# Patient Record
Sex: FEMALE | Race: WHITE
[De-identification: names, ages, dates, MRNs, and addresses within clinical notes are randomized per-mention and may not be internally consistent; named-entity substitution may affect disease eponyms.]

---

## 2017-05-24 ENCOUNTER — HOSPITAL ENCOUNTER (OUTPATIENT)
Dept: HOSPITAL 80 - FIMAGING | Age: 47
End: 2017-05-24
Attending: FAMILY MEDICINE
Payer: COMMERCIAL

## 2017-05-24 DIAGNOSIS — Z12.31: Primary | ICD-10-CM

## 2017-05-24 PROCEDURE — G0202 SCR MAMMO BI INCL CAD: HCPCS

## 2018-06-06 ENCOUNTER — HOSPITAL ENCOUNTER (OUTPATIENT)
Dept: HOSPITAL 80 - FIMAGING | Age: 48
End: 2018-06-06
Attending: FAMILY MEDICINE
Payer: COMMERCIAL

## 2018-06-06 DIAGNOSIS — Z12.31: Primary | ICD-10-CM

## 2019-04-18 ENCOUNTER — HOSPITAL ENCOUNTER (INPATIENT)
Dept: HOSPITAL 80 - FED | Age: 49
LOS: 1 days | Discharge: HOME | DRG: 312 | End: 2019-04-19
Attending: SURGERY | Admitting: SURGERY
Payer: COMMERCIAL

## 2019-04-18 DIAGNOSIS — S01.511A: ICD-10-CM

## 2019-04-18 DIAGNOSIS — I61.0: ICD-10-CM
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
## 2019-04-18 DIAGNOSIS — R55: Primary | ICD-10-CM

## 2019-04-18 DIAGNOSIS — S02.5XXA: ICD-10-CM
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
## 2019-04-18 LAB
INR PPP: 1.06 (ref 0.83–1.16)
PLATELET # BLD: 201 10^3/UL (ref 150–400)
PROTHROMBIN TIME: 13.4 SEC (ref 12–15)

## 2019-04-18 PROCEDURE — 0CQ1XZZ REPAIR LOWER LIP, EXTERNAL APPROACH: ICD-10-PCS

## 2019-04-18 PROCEDURE — A9500 TC99M SESTAMIBI: HCPCS

## 2019-04-18 RX ADMIN — IBUPROFEN SCH: 600 TABLET ORAL at 23:31

## 2019-04-18 RX ADMIN — ACETAMINOPHEN PRN MG: 325 TABLET ORAL at 18:25

## 2019-04-18 RX ADMIN — BACITRACIN ZINC SCH: 500 OINTMENT TOPICAL at 23:31
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
## 2019-04-19 VITALS — DIASTOLIC BLOOD PRESSURE: 77 MMHG | SYSTOLIC BLOOD PRESSURE: 111 MMHG

## 2019-04-19 RX ADMIN — BACITRACIN ZINC SCH APP: 500 OINTMENT TOPICAL at 01:24

## 2019-04-19 RX ADMIN — IBUPROFEN SCH MG: 600 TABLET ORAL at 08:59

## 2019-04-19 RX ADMIN — BACITRACIN ZINC SCH APP: 500 OINTMENT TOPICAL at 08:58

## 2019-04-19 RX ADMIN — ACETAMINOPHEN PRN MG: 325 TABLET ORAL at 15:20

## 2019-04-19 RX ADMIN — IBUPROFEN SCH: 600 TABLET ORAL at 06:40

## 2019-04-19 RX ADMIN — IBUPROFEN SCH MG: 600 TABLET ORAL at 01:32
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
## 2019-04-19 NOTE — PDCARST
CAR Stress Test Results


Type of Stress Test: Lexiscan stress test


Indication: syncope


Description of Procedure: After informed consent was obtained, pt was 

established to ECG, blood pressure, HR and oximetry monitoring.  STRESS EKG AND 

HEMODYNAMIC DATA.  Resting heart rate:  63 BPM.  Resting ECG: SR.  Resting 

blood pressure: 104/62  mmHg.  O2 saturation at rest: 93%.  Peak heart rate: 89

  BPM.  Peak blood pressure:    98/56  mmHg.  Arrhythmias:  none.  Symptoms: 

The patient experienced no typical symptoms of angina during stress or 

recovery.  Stress/Infusion ECG: No change in rhythm with no significant ST/T 

wave changes.  Stress/infusion O2 saturation: 97%


Impression: Uneventful Lexiscan infusion.


Conclusion: Await nuclear images.

## 2019-04-19 NOTE — ECHO
[email or web address]





[address] [address] 

Main: [phone] 



Echocardiography Examination 

Transthoracic 



Name:            BJ MEDINA                       MR#:

[id number]

Study Date:      04/19/2019                             Study Time:

08:02 AM

YOB: 1970                             Age:

48 year(s)

Height:          167.6 cm (66 in.)                      Weight:

61.24 kg (135 lb.)

BSA:             1.69 m2                                Gender:

Female

Examination:     Echo                                   Contrast: 

Image Quality:   Adequate                               Rhythm: 

Heart Rate:                                             BP:

108 mmHg/63 mmHg

Indication:      ?syncope 



Procedure Staff 

Referring Physician: 

Echocardiographer:         Shannon Zamora [facility] 

Reading Physician:         Damir Montana MD 

Requesting Provider: 

Indication:                ?syncope 



Measurements 

Chambers                                          AV/MV 

Label                 Value       Normal Value          Label

Value       Normal Value

LVOT Vmax            0.91 m/s     (0.7m/s - 1.1m/s)     AV PGmax

7 mmHg

LVOTd                2 cm         (1.8cm - 2cm)         AV PGmean

5 mmHg

LVOT VTI             21.1 cm      (18cm - 22cm)         AV Vmax

1.31 m/s

LVDd, 2D             4.5 cm       (3.9cm - 5.3cm)       JONH (Vmax)

2.2 cm2

LVDs, 2D             2.7 cm       (2.1cm - 4cm)         JONH (VTI)

2.2 cm2

IVSd, 2D             0.9 cm       (0.6cm - 1.1cm)       MV E Vmax

0.76 m/s

LVPWd, 2D            0.8 cm                             MV A Vmax

0.45 m/s

LVEF, BP             57 %         (55% - 70%)           MV E/A

1.69

LVEF, 2D             70 %         (54% - 74%)           MV E/E'

lateral      6.7

LVOT PGmean          2 mmHg                             MV E/E' septal

8.1 (0.5 - 1.7)

LVOT Vmean           0.71 m/s                           MV DT

243 ms

RVDd, 2D             1.9 cm       (1.9cm - 3.8cm)       MV E' septal

0.09 m/s

LA Volume, BP        22 ml        (22ml - 52ml)         MV PHT

0.07 s

LADs, 2D             2.8 cm       (2.7cm - 3.8cm)       MVA PHT

3 cm2

LAESV index, BP      13 ml/m2                           MV E' lateral

0.11 m/s

RA Area              10.4 cm2                           MV E/E' mean

7.6

Additional Vessels                                      MV PHT

73 ms

Label                 Value       Normal Value          MV E' mean

0.1 m/s

AoAsc                2.8 cm                       TV/PV 

AoRoot, 2D           2.7 cm       (1.4cm - 2.6cm)       Label

Value       Normal Value



Patient: BJ MEDINA                     MRN: [id number]

Study Date: 04/19/2019   Page 1 of 3

08:02 AM 









IVC 2.2 cm (1.2cm - 2.3cm) RA Pressure 5 mmHg 

PV PGmax             4 mmHg 

PV Vmax, Caliper     0.99 m/s     (0.6m/s - 0.9m/s) 



Conclusions 



Left Ventricle: 

 Left ventricle is normal in size.  

 EF range is estimated at 55 % - 60 %.  

 Left ventricular diastolic function parameters are normal.  

 No LV hypertrophy.  



Right Ventricle: 

 Right ventricular systolic function is normal.  



Left Atrium: 

 The left atrium is normal in size.  



Right Atrium: 

 The right atrium is normal in size.  



Pericardium: 

 No pericardial effusion.  



Findings 



Left Ventricle: 

Left ventricle is normal in size. Normal global systolic left

ventricular function. The ejection fraction, measured by

Simpsons method, is 57 %. EF range is estimated at 55 % - 60 %. Left

ventricle wall thickness is normal. There are no

regional wall motion abnormalities. Left ventricular diastolic

function parameters are normal. No LV hypertrophy.

Right Ventricle: 

Normal size right ventricle. Right ventricular systolic function is

normal.

Left Atrium: 

The left atrium is normal in size.  

Right Atrium: 

The right atrium is normal in size.  

Mitral Valve: 

Mitral valve appears structurally normal. Mild mitral regurgitation.

No mitral valve stenosis.

Aortic Valve: 

Aortic leaflets are structurally normal. Trivial aortic regurgitation

is present. There is no aortic stenosis.

Tricuspid Valve: 

Tricuspid valve leaflets are structurally normal. Trivial tricuspid

regurgitation. No tricuspid valve stenosis. Pulmonary

artery pressure cannot be assessed due to inadequate TR signal.  

Pulmonic Valve: 

Pulmonic leaflets are structurally normal. No significant pulmonic

valve regurgitation is evident.

Aorta: 

The aortic root size in 2D measures 2.7 cm. The ascending aorta

measures 2.8 cm.

Aorta Measurements 

AoRoot, 2D is 2.7 cm.  

IVC: 

The inferior vena cava is normal in size.  

Pericardium: 

No pericardial effusion. No pleural effusion present.  



Patient: BJ MEDINA                     MRN: [id number]

Study Date: 04/19/2019   Page 2 of 3

08:02 AM 









Exam Details 

Procedure Ordered:         Echo 

Procedure Status:          Routine study 

Image Quality:             Adequate 

Facility Location:         Bedside 



Electronically signed by Damir Montana MD on 04/19/2019 at 06:36 PM 

(No Signature Object) 



Patient: BJ MEDINA                     MRN: [id number]

Study Date: 04/19/2019   Page 3 of 3

08:02 AM 







[id number]
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
## 2019-04-20 NOTE — CPEKG
Test Reason : OPEN

Blood Pressure : ***/*** mmHG

Vent. Rate : 063 BPM     Atrial Rate : 063 BPM

   P-R Int : 182 ms          QRS Dur : 078 ms

    QT Int : 425 ms       P-R-T Axes : 028 059 048 degrees

   QTc Int : 436 ms

 

Sinus rhythm

 

Confirmed by Chel Jose ([id number]) on 4/20/2019 3:14:22 PM

 

Referred By: Fred Ulloa           Confirmed By:Chel Jose

## 2019-06-07 ENCOUNTER — HOSPITAL ENCOUNTER (OUTPATIENT)
Dept: HOSPITAL 80 - FIMAGING | Age: 49
End: 2019-06-07
Payer: COMMERCIAL

## 2024-09-22 NOTE — CPEKG
Test Reason : OPEN

Blood Pressure : ***/*** mmHG

Vent. Rate : 072 BPM     Atrial Rate : 073 BPM

   P-R Int : 204 ms          QRS Dur : 088 ms

    QT Int : 454 ms       P-R-T Axes : 073 085 054 degrees

   QTc Int : 497 ms

 

Sinus rhythm

Borderline prolonged SC interval

Borderline prolonged QT interval

 

Confirmed by Katrin Chan ([id number]) on 4/18/2019 7:40:16 PM

 

Referred By: KATRIN CHAN           Confirmed By:Katrin Chan Yes